# Patient Record
Sex: FEMALE | Race: BLACK OR AFRICAN AMERICAN | NOT HISPANIC OR LATINO | URBAN - METROPOLITAN AREA
[De-identification: names, ages, dates, MRNs, and addresses within clinical notes are randomized per-mention and may not be internally consistent; named-entity substitution may affect disease eponyms.]

---

## 2022-02-13 ENCOUNTER — EMERGENCY (EMERGENCY)
Facility: HOSPITAL | Age: 27
LOS: 1 days | Discharge: HOME | End: 2022-02-13
Admitting: EMERGENCY MEDICINE
Payer: COMMERCIAL

## 2022-02-13 VITALS
WEIGHT: 168.21 LBS | DIASTOLIC BLOOD PRESSURE: 93 MMHG | HEART RATE: 77 BPM | SYSTOLIC BLOOD PRESSURE: 129 MMHG | TEMPERATURE: 99 F | HEIGHT: 66 IN | OXYGEN SATURATION: 100 % | RESPIRATION RATE: 19 BRPM

## 2022-02-13 VITALS
SYSTOLIC BLOOD PRESSURE: 122 MMHG | DIASTOLIC BLOOD PRESSURE: 74 MMHG | TEMPERATURE: 98 F | HEART RATE: 71 BPM | RESPIRATION RATE: 16 BRPM | OXYGEN SATURATION: 100 %
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
## 2022-02-13 DIAGNOSIS — M79.604 PAIN IN RIGHT LEG: ICD-10-CM

## 2022-02-13 DIAGNOSIS — M25.561 PAIN IN RIGHT KNEE: ICD-10-CM

## 2022-02-13 DIAGNOSIS — M25.571 PAIN IN RIGHT ANKLE AND JOINTS OF RIGHT FOOT: ICD-10-CM

## 2022-02-13 DIAGNOSIS — Z91.010 ALLERGY TO PEANUTS: ICD-10-CM
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
## 2022-02-13 PROCEDURE — 99284 EMERGENCY DEPT VISIT MOD MDM: CPT

## 2022-02-13 PROCEDURE — 99284 EMERGENCY DEPT VISIT MOD MDM: CPT | Mod: 25

## 2022-02-13 PROCEDURE — 73610 X-RAY EXAM OF ANKLE: CPT

## 2022-02-13 PROCEDURE — 73562 X-RAY EXAM OF KNEE 3: CPT

## 2022-02-13 PROCEDURE — 73610 X-RAY EXAM OF ANKLE: CPT | Mod: 26,RT

## 2022-02-13 PROCEDURE — 73562 X-RAY EXAM OF KNEE 3: CPT | Mod: 26,RT

## 2022-02-13 RX ORDER — IBUPROFEN 200 MG
600 TABLET ORAL ONCE
Refills: 0 | Status: COMPLETED | OUTPATIENT
Start: 2022-02-13 | End: 2022-02-13

## 2022-02-13 RX ADMIN — Medication 600 MILLIGRAM(S): at 01:45

## 2022-02-13 RX ADMIN — Medication 600 MILLIGRAM(S): at 01:05

## 2022-02-13 NOTE — ED PROVIDER NOTE - PATIENT PORTAL LINK FT
You can access the FollowMyHealth Patient Portal offered by [facility] by registering at the following website: [email or web address]. By joining [facility]’s FollowMyHealth portal, you will also be able to view your health information using other applications (apps) compatible with our system.

## 2022-02-13 NOTE — ED PROVIDER NOTE - NSFOLLOWUPINSTRUCTIONS_ED_ALL_ED_FT
P.R.I.C.E. Treatment    WHAT YOU NEED TO KNOW:    P.R.I.C.E. treatment is a 5-step process used to decrease swelling and pain caused by an injury. P.R.I.C.E. stands for protect, rest, ice, compress, and elevate. Start P.R.I.C.E. within 24 to 48 hours of an injury.    DISCHARGE INSTRUCTIONS:    Return to the emergency department if:   •Your pain is severe.      •You have severe swelling or deformity.      •You have numbness in the injured area.      Call your doctor if:   •Your pain and swelling do not go away after a few days.      •You have questions or concerns about your condition or care.      How to use P.R.I.C.E. treatment:     R.I.C.E.     •Protect your injury from more damage. Support the injured area with a brace or splint. Your healthcare provider will tell you how long to use the brace or splint.      •Rest your injured area as directed. You may need to stop using, or keep weight off, the injury for 48 hours or longer. Your healthcare provider may recommend crutches or another device. Return to your usual activities as directed.      •Apply ice on your injured area for 15 to 20 minutes every 4 hours or as directed. Use an ice pack, or put crushed ice in a plastic bag. Cover the bag with a towel before you apply it to your skin. Ice helps prevent tissue damage and decreases swelling and pain.      •Compress (keep pressure on) the injured area. Compression will help decrease swelling and support the injured area. Use an elastic bandage, air stirrup, splint, or sling as directed. If you use an elastic bandage, make sure the bandage is not too tight. You should be able to slip 2 fingers between the bandage and your skin.      •Elevate the injured area above the level of your heart as often as you can. This will help decrease swelling and pain. Prop the injured area on pillows or blankets to keep it elevated comfortably.      Follow up with your doctor as directed: Write down your questions so you remember to ask them during your visits.

## 2022-02-13 NOTE — ED PROVIDER NOTE - OBJECTIVE STATEMENT
25 y/o f presents c/o right leg pain s/p fall today.  Pt stating she slipped while out to dinner tonight, twisted her right leg.  Pt reports pain to her right knee and right ankle.  Pt has been ambulatory after the fall.  Denies numbness/tingling to ext, all other ROS negative.

## 2022-02-13 NOTE — ED PROVIDER NOTE - MUSCULOSKELETAL, MLM
right knee with no swelling or deformity, +FROM, +mild medial tenderness.  right ankle +lateral malleolar tenderness, no swelling or deformity, +FROM, strength 5/5, sensation intact distally

## 2022-02-13 NOTE — ED ADULT NURSE NOTE - OBJECTIVE STATEMENT
s/p fall from stairs with 8 to 12 steps with bilateral lower leg pain more on right side, shoulder pain and mid back pain

## 2022-02-13 NOTE — ED PROVIDER NOTE - CLINICAL SUMMARY MEDICAL DECISION MAKING FREE TEXT BOX
27 y/o f presents s/p slip and fall with right knee and ankle pain.  Ext NVI, no head trauma, xrays neg.  Ace wrap applied in ED, will d/c, recommend RICE, NSAIDs PRN pain.

## 2022-02-13 NOTE — ED ADULT NURSE REASSESSMENT NOTE - NS ED NURSE REASSESS COMMENT FT1
Ace bandage applied to rt knee, pt dcd to home, utilizing cane, no s/s distress noted at time of departure.

## 2022-02-13 NOTE — ED ADULT TRIAGE NOTE - OTHER COMPLAINTS
CC mechanical fall x tonight and complains of R leg pain, able to bear weight. denies any alcohol use.

## 2024-10-29 NOTE — ED ADULT NURSE NOTE - CHIEF COMPLAINT
Performing Laboratory: 0
Billing Type: Third-Party Bill
Expected Date Of Service: 10/29/2024
Bill For Surgical Tray: no
The patient is a 26y Female complaining of fall.